# Patient Record
(demographics unavailable — no encounter records)

---

## 2025-07-09 NOTE — HISTORY OF PRESENT ILLNESS
[de-identified] : GRADY BEACH is a 8 year old male with a history of the last 2-3 years he has recurrent colds, stuffy nose all year round. He was taken to his pediatrician who gave nose drops and nose sprays but was never given any oral medications. Never hospitalized. 4 years ago he was given peanut butter for the first time and he began to vomit and parents avoided it for a while. Two years ago they gave him peanut butter and he tolerated it well. He eats all foods with no issues. 2 years ago his pediatrician did allergy testing for various foods that showed various positives which he eats. He had skin testing to various foods including tree nuts and shellfish last visit. 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 I had a discussion with the grandmother and told her to avoid shellfish completely and have EpiPen available. Grandmother understood the significance of it. I had instructed to maintain skin with moisturizers.  Mom states that Grady he's tried peanut butter, eggs, bread, milk, corn and some fruits and he has had no allergic reactions to any of them.  He is here today for his yearly follow up, mom says he is still avoiding shellfish, no incidents in the past year. He needs updated EpiPen and school forms filled out, he currently has a cough.

## 2025-07-09 NOTE — REVIEW OF SYSTEMS
"Requested Prescriptions   Pending Prescriptions Disp Refills     raloxifene (EVISTA) 60 MG tablet [Pharmacy Med Name: RALOXIFENE HCL 60 MG TABLET] 90 tablet 3     Sig: TAKE ONE (1) TABLET BY MOUTH DAILY       Bisphosphonates Failed - 4/17/2019 12:34 PM        Failed - Dexa on file within past 2 years     Please review last Dexa result.           Failed - Normal serum creatinine on file within past 12 months     Recent Labs   Lab Test 02/19/18  0750   CR 0.91             Passed - Recent (12 mo) or future (30 days) visit within the authorizing provider's specialty     Patient had office visit in the last 12 months or has a visit in the next 30 days with authorizing provider or within the authorizing provider's specialty.  See \"Patient Info\" tab in inbasket, or \"Choose Columns\" in Meds & Orders section of the refill encounter.              Passed - Medication is active on med list        Passed - Patient is age 18 or older        Last Written Prescription Date:  5/1/18  Last Fill Quantity: 90,  # refills: 3   Last office visit: 2/28/2019 with prescribing provider:  ANDREINA Salmeron     Medication is being filled for 1 time refill only due to:  Patient needs to be seen because due for annual exam 5/1/19 for further refills.. Message sent via pharmacy notes to pt.        "
[Nl] : Genitourinary